# Patient Record
Sex: FEMALE | Race: WHITE
[De-identification: names, ages, dates, MRNs, and addresses within clinical notes are randomized per-mention and may not be internally consistent; named-entity substitution may affect disease eponyms.]

---

## 2017-01-25 ENCOUNTER — HOSPITAL ENCOUNTER (OUTPATIENT)
Dept: HOSPITAL 45 - C.LABSPEC | Age: 22
Discharge: HOME | End: 2017-01-25
Attending: OBSTETRICS & GYNECOLOGY
Payer: COMMERCIAL

## 2017-01-25 ENCOUNTER — HOSPITAL ENCOUNTER (OUTPATIENT)
Dept: HOSPITAL 45 - C.PAPS | Age: 22
Discharge: HOME | End: 2017-01-25
Attending: OBSTETRICS & GYNECOLOGY
Payer: COMMERCIAL

## 2017-01-25 DIAGNOSIS — Z12.4: Primary | ICD-10-CM

## 2017-01-29 LAB
CHLAMYDIA TRACH RNA***: NOT DETECTED
GC (NEIS GONORRHOEAE)RNA**: NOT DETECTED

## 2018-04-06 ENCOUNTER — HOSPITAL ENCOUNTER (EMERGENCY)
Dept: HOSPITAL 45 - C.EDB | Age: 23
Discharge: HOME | End: 2018-04-06
Payer: COMMERCIAL

## 2018-04-06 VITALS
BODY MASS INDEX: 36.06 KG/M2 | WEIGHT: 211.2 LBS | HEIGHT: 64.02 IN | BODY MASS INDEX: 36.06 KG/M2 | WEIGHT: 211.2 LBS | HEIGHT: 64.02 IN

## 2018-04-06 VITALS — DIASTOLIC BLOOD PRESSURE: 92 MMHG | OXYGEN SATURATION: 98 % | SYSTOLIC BLOOD PRESSURE: 147 MMHG | HEART RATE: 88 BPM

## 2018-04-06 VITALS — TEMPERATURE: 98.24 F

## 2018-04-06 DIAGNOSIS — F17.200: ICD-10-CM

## 2018-04-06 DIAGNOSIS — J02.9: Primary | ICD-10-CM

## 2018-04-06 NOTE — EMERGENCY ROOM VISIT NOTE
History


First contact with patient:  21:32


Chief Complaint:  SORETHROAT


Stated Complaint:  SEVERE THROAT PAIN, EAR PAIN





History of Present Illness


The patient is a 22 year old female who presents to the Emergency Room with 

complaints of a sore throat.  The patient reports that she initially developed 

symptoms 1 week ago.  She states the symptoms initially improved, then returned 

about 5 days ago.  She was seen 4 days ago by [facility] due to the pain and 

had a negative rapid strep test.  A culture was obtained but she states there 

have been no results yet.  She was told that her symptoms were consistent with 

strep pharyngitis and was prescribed amoxicillin.  She has been taking this for 

the past 4 days without improvement.  She complains of throat pain which is 

sharp and rated a 7/10.  Her pain is worse with swallowing.  She states that 

the glands in her neck hurt.  She reports earaches.  She has noticed white 

patches on her throat.  She was initially taking ibuprofen for her symptoms.  

She states that she is now taking Robaxin which she got from her mother which 

sometimes helps her pain.  She denies any rashes, fevers, cough or difficulty 

swallowing or breathing.  She denies abdominal pain, nausea or vomiting.





Review of Systems


A complete 10 point review of systems was reviewed with the patient with 

pertinent positives and negatives as per history of present illness. All else 

were negative.





Past Medical/Surgical History


Medical Problems:


(1) No Known Active Medical Problems








Social History


Smoking Status:  Current Every Day Smoker


Alcohol Use:  occasionally


Housing Status:  lives alone


Occupation Status:  employed





Current/Historical Medications


Scheduled


Amoxicillin (Amoxil), 875 MG PO BID


Birth Control Pills (Birth Control Pills), 1 TAB PO DAILY


Prednisone (Prednisone), 50 MG PO DAILY





Physical Exam


Vital Signs











  Date Time  Temp Pulse Resp B/P (MAP) Pulse Ox O2 Delivery O2 Flow Rate FiO2


 


4/6/18 21:31     98   


 


4/6/18 21:24 36.8 113 19 148/91 98 Room Air  











Physical Exam


VITALS: Vitals are noted on the nurse's note and reviewed by myself.  


GENERAL: This is a 22-year-old female, in no acute distress, nontoxic in 

appearance, well-developed well-nourished.


SKIN: The skin was without rashes.


EARS: External auditory canals clear, tympanic membranes pearly gray without 

erythema or effusion bilaterally.


EYES: Pupils equal round and reactive to light and accommodation.  


NOSE: Patent, turbinates without inflammation or discharge.  


MOUTH: Mucous membranes moist.  Tonsils are enlarged, 2+ bilaterally with white 

exudate present.  Uvula midline.  Airway patent.


NECK: Supple without nuchal rigidity.  Mild tender anterior lymphadenopathy.


HEART: Regular rate and rhythm without murmurs gallops or rubs.


LUNGS: Clear to auscultation bilaterally without wheezes, rales or rhonchi.  


NEURO: Patient was alert and oriented to person place and time.





Medical Decision & Procedures


Medications Administered











 Medications


  (Trade)  Dose


 Ordered  Sig/Flaco


 Route  Start Time


 Stop Time Status Last Admin


Dose Admin


 


 Prednisone


  (PredniSONE TAB)  50 mg  NOW  STAT


 PO  4/6/18 21:53


 4/6/18 21:54 DC 4/6/18 22:05


50 MG











Medical Decision


Differential diagnosis includes strep pharyngitis, infectious mononucleosis, 

viral pharyngitis, peritonsillar abscess, retropharyngeal abscess, Car's 

angina, among others.





The patient was evaluated as above.  Her physical exam is most consistent with 

strep pharyngitis.  There is no evidence of peritonsillar or retropharyngeal 

abscess.  The patient has a patent airway.  I did discuss possibility of 

infectious mononucleosis and benefits/risks of testing.  Patient prefers not to 

have any laboratory testing drawn at this time.  She was informed that this is 

a possibility and if she has persistent symptoms or severe fatigue, she should 

be evaluated by her primary care provider for this.  She is already taking an 

antibiotic at home.  She will be placed on prednisone to help decrease 

inflammation.  Conservative measures were discussed.  She verbalized 

understanding of my assessment and treatment plan and was discharged home in 

good condition.





Blood Pressure Screening


Patient's blood pressure:  Elevated blood pressure


Blood pressure disposition:  Elevated BP felt to be situational





Impression





 Primary Impression:  


 Exudative pharyngitis





Departure Information


Dispostion


Home / Self-Care





Condition


GOOD





Prescriptions





Prednisone (Prednisone) 50 Mg Tab


50 MG PO DAILY for 4 Days, #4 TAB


   Prov: Katrin Dempsey .ANAND         4/6/18





Referrals


Joaquin Quinones M.D. (PCP)





Patient Instructions


My [facility]





Additional Instructions





You were seen in the emergency department for your sore throat. 





Continue your antibiotics as prescribed.





Prednisone, 50 mg daily as prescribed for the next 4 days.  This will help to 

decrease inflammation in your throat.





For pain and fever control, you can use the following over-the-counter 

medicines (if >13 yo):





- Regular strength (325mg/tab) Tylenol (acetaminophen) 2 tabs every 4-6 hours 

as needed. Do not exceed 12 tablets in a 24 hour period. Avoid taking more than 

4 grams (4000 mg) of Tylenol per day. This includes any other sources of 

acetaminophen you may take on a regular basis.





- Regular strength (200 mg/tab) Advil (ibuprofen) 3-4 tabs every 4-6 hours as 

needed. Do not exceed a dose of 3200 mg per day.


 


- For best results, alternate dosing of Tylenol and Advil.





In addition to your prescribed medications, you can also use the following home 

remedies:





- Warm salt-water gargles 3 times per day can soothe your throat and help to 

fight infection.





- Warm tea with honey can soothe your throat.





Return to the emergency department if your symptoms persist or worsen over the 

next 2-3 days despite treatment course outlined above. Return to the emergency 

department if you develop the following symptoms of: inability to swallow solids

, liquids, or drool; excessive wheezing or inability to catch your breath; or 

intractable fever or pain.





Follow up with your primary care provider in 2-3 days from today's emergency 

department visit.

## 2018-08-02 ENCOUNTER — HOSPITAL ENCOUNTER (OUTPATIENT)
Dept: HOSPITAL 45 - C.PAPS | Age: 23
Discharge: HOME | End: 2018-08-02
Attending: OBSTETRICS & GYNECOLOGY
Payer: COMMERCIAL

## 2018-08-02 ENCOUNTER — HOSPITAL ENCOUNTER (OUTPATIENT)
Dept: HOSPITAL 45 - C.LABSPEC | Age: 23
Discharge: HOME | End: 2018-08-02
Attending: OBSTETRICS & GYNECOLOGY
Payer: COMMERCIAL

## 2018-08-02 DIAGNOSIS — Z11.8: ICD-10-CM

## 2018-08-02 DIAGNOSIS — Z11.51: ICD-10-CM

## 2018-08-02 DIAGNOSIS — R87.610: ICD-10-CM

## 2018-08-02 DIAGNOSIS — Z11.3: Primary | ICD-10-CM

## 2018-08-02 DIAGNOSIS — Z12.4: Primary | ICD-10-CM
